# Patient Record
Sex: FEMALE | Race: WHITE | Employment: FULL TIME | URBAN - METROPOLITAN AREA
[De-identification: names, ages, dates, MRNs, and addresses within clinical notes are randomized per-mention and may not be internally consistent; named-entity substitution may affect disease eponyms.]

---

## 2017-05-18 ENCOUNTER — OFFICE VISIT (OUTPATIENT)
Dept: FAMILY MEDICINE CLINIC | Facility: CLINIC | Age: 22
End: 2017-05-18

## 2017-05-18 VITALS
HEART RATE: 82 BPM | DIASTOLIC BLOOD PRESSURE: 74 MMHG | TEMPERATURE: 99 F | SYSTOLIC BLOOD PRESSURE: 126 MMHG | WEIGHT: 243.63 LBS

## 2017-05-18 DIAGNOSIS — L08.9 INFECTION, SKIN: Primary | ICD-10-CM

## 2017-05-18 PROCEDURE — 99213 OFFICE O/P EST LOW 20 MIN: CPT | Performed by: NURSE PRACTITIONER

## 2017-05-18 RX ORDER — CEPHALEXIN 500 MG/1
500 CAPSULE ORAL 3 TIMES DAILY
Qty: 30 CAPSULE | Refills: 0 | Status: SHIPPED | OUTPATIENT
Start: 2017-05-18 | End: 2017-05-28

## 2017-05-18 NOTE — PROGRESS NOTES
Mathew Wilkins is a 19year old female. Patient presents with:   Infection: tattoo on right ankle      HPI:   Complaints of infection to right medial ankle- tattoo on Saturday (5/13) - started slight red on Sunday- then getting worse in the last few days, PLAN:     Infection, skin  (primary encounter diagnosis)    No orders of the defined types were placed in this encounter.        Meds & Refills for this Visit:  Signed Prescriptions Disp Refills    cephALEXin (KEFLEX) 500 MG Oral Cap 30 capsule 0      Sig:

## 2017-05-18 NOTE — PATIENT INSTRUCTIONS
Take Keflex 3 times a day for 7 days–if the infection is still present finish the 10 days. You can continue to apply bacitracin if he would like.     Follow-up if the redness spreads, more swelling, fever, red streak up her leg, etc.

## 2017-08-10 ENCOUNTER — TELEPHONE (OUTPATIENT)
Dept: FAMILY MEDICINE CLINIC | Facility: CLINIC | Age: 22
End: 2017-08-10

## 2017-08-10 DIAGNOSIS — Z28.3 IMMUNIZATION DEFICIENCY: Primary | ICD-10-CM

## 2017-08-10 NOTE — TELEPHONE ENCOUNTER
Patient states she used to live in [facility] so she got most of her immunizations done there. Patient wondering if we have a record of her MMR. Looking in [employer], MMR not noted. Patient informed and will contact her previous Doctor for records.  Júnior Dominguez

## 2017-08-10 NOTE — TELEPHONE ENCOUNTER
Would like to get mmr & rubella titer for work. Need an order put in.   Please let patient know when complete

## 2017-08-10 NOTE — TELEPHONE ENCOUNTER
See previous phone note; childhood immunizations given in [facility] and patient does not have a record of them. Patient is needing to have titers drawn; MMR and Varicella. Please advise orders.  Fiona Farias, 08/10/17, 4:51 PM

## 2017-08-11 NOTE — TELEPHONE ENCOUNTER
Please ask patient if she needs a physical–if she has a form. If she needs a physical form completed she should have an appointment.   If it is just immunization improve then she does not need an appointment

## 2017-12-30 RX ORDER — CITALOPRAM 20 MG/1
TABLET ORAL
Qty: 30 TABLET | Refills: 2 | Status: SHIPPED | OUTPATIENT
Start: 2017-12-30 | End: 2018-02-15

## 2017-12-30 NOTE — TELEPHONE ENCOUNTER
Refill given–please notify patient she is due for a pap/physical-have her schedule- I gave her 3 months worth of medication so she will have time

## 2018-02-15 ENCOUNTER — OFFICE VISIT (OUTPATIENT)
Dept: FAMILY MEDICINE CLINIC | Facility: CLINIC | Age: 23
End: 2018-02-15

## 2018-02-15 ENCOUNTER — APPOINTMENT (OUTPATIENT)
Dept: LAB | Age: 23
End: 2018-02-15
Attending: NURSE PRACTITIONER
Payer: COMMERCIAL

## 2018-02-15 VITALS
SYSTOLIC BLOOD PRESSURE: 112 MMHG | HEIGHT: 67 IN | HEART RATE: 76 BPM | BODY MASS INDEX: 38.14 KG/M2 | RESPIRATION RATE: 16 BRPM | DIASTOLIC BLOOD PRESSURE: 78 MMHG | WEIGHT: 243 LBS | TEMPERATURE: 97 F

## 2018-02-15 DIAGNOSIS — Z00.00 ENCOUNTER FOR HEALTH MAINTENANCE EXAMINATION IN ADULT: Primary | ICD-10-CM

## 2018-02-15 DIAGNOSIS — F34.1 DYSTHYMIA: ICD-10-CM

## 2018-02-15 LAB
ALBUMIN SERPL-MCNC: 3.9 G/DL (ref 3.5–4.8)
ALP LIVER SERPL-CCNC: 83 U/L (ref 37–98)
ALT SERPL-CCNC: 42 U/L (ref 14–54)
AST SERPL-CCNC: 26 U/L (ref 15–41)
BASOPHILS # BLD AUTO: 0.02 X10(3) UL (ref 0–0.1)
BASOPHILS NFR BLD AUTO: 0.2 %
BILIRUB SERPL-MCNC: 0.7 MG/DL (ref 0.1–2)
BUN BLD-MCNC: 14 MG/DL (ref 8–20)
CALCIUM BLD-MCNC: 9 MG/DL (ref 8.3–10.3)
CHLORIDE: 107 MMOL/L (ref 101–111)
CHOLEST SMN-MCNC: 137 MG/DL (ref ?–190)
CO2: 24 MMOL/L (ref 22–32)
CREAT BLD-MCNC: 0.8 MG/DL (ref 0.55–1.02)
EOSINOPHIL # BLD AUTO: 0.2 X10(3) UL (ref 0–0.3)
EOSINOPHIL NFR BLD AUTO: 2.2 %
ERYTHROCYTE [DISTWIDTH] IN BLOOD BY AUTOMATED COUNT: 13.5 % (ref 11.5–16)
FREE T4: 0.8 NG/DL (ref 0.9–1.8)
GLUCOSE BLD-MCNC: 86 MG/DL (ref 70–99)
HAV AB SERPL IA-ACNC: 383 PG/ML (ref 193–986)
HCT VFR BLD AUTO: 43.2 % (ref 34–50)
HDLC SERPL-MCNC: 70 MG/DL (ref 45–?)
HDLC SERPL: 1.96 {RATIO} (ref ?–4.44)
HGB BLD-MCNC: 13.8 G/DL (ref 12–16)
IMMATURE GRANULOCYTE COUNT: 0.04 X10(3) UL (ref 0–1)
IMMATURE GRANULOCYTE RATIO %: 0.4 %
LDLC SERPL CALC-MCNC: 60 MG/DL (ref ?–120)
LYMPHOCYTES # BLD AUTO: 3.17 X10(3) UL (ref 0.9–4)
LYMPHOCYTES NFR BLD AUTO: 34.9 %
M PROTEIN MFR SERPL ELPH: 7.7 G/DL (ref 6.1–8.3)
MCH RBC QN AUTO: 26.8 PG (ref 27–33.2)
MCHC RBC AUTO-ENTMCNC: 31.9 G/DL (ref 31–37)
MCV RBC AUTO: 84 FL (ref 81–100)
MONOCYTES # BLD AUTO: 0.56 X10(3) UL (ref 0.1–1)
MONOCYTES NFR BLD AUTO: 6.2 %
NEUTROPHIL ABS PRELIM: 5.09 X10 (3) UL (ref 1.3–6.7)
NEUTROPHILS # BLD AUTO: 5.09 X10(3) UL (ref 1.3–6.7)
NEUTROPHILS NFR BLD AUTO: 56.1 %
NONHDLC SERPL-MCNC: 67 MG/DL (ref ?–150)
PLATELET # BLD AUTO: 362 10(3)UL (ref 150–450)
POTASSIUM SERPL-SCNC: 4 MMOL/L (ref 3.6–5.1)
RBC # BLD AUTO: 5.14 X10(6)UL (ref 3.8–5.1)
RED CELL DISTRIBUTION WIDTH-SD: 41.7 FL (ref 35.1–46.3)
SODIUM SERPL-SCNC: 139 MMOL/L (ref 136–144)
TRIGL SERPL-MCNC: 37 MG/DL (ref ?–115)
TSI SER-ACNC: 1.3 MIU/ML (ref 0.35–5.5)
VLDLC SERPL CALC-MCNC: 7 MG/DL (ref 5–40)
WBC # BLD AUTO: 9.1 X10(3) UL (ref 4–13)

## 2018-02-15 PROCEDURE — 80050 GENERAL HEALTH PANEL: CPT | Performed by: NURSE PRACTITIONER

## 2018-02-15 PROCEDURE — 80061 LIPID PANEL: CPT | Performed by: NURSE PRACTITIONER

## 2018-02-15 PROCEDURE — 36415 COLL VENOUS BLD VENIPUNCTURE: CPT | Performed by: NURSE PRACTITIONER

## 2018-02-15 PROCEDURE — 84439 ASSAY OF FREE THYROXINE: CPT | Performed by: NURSE PRACTITIONER

## 2018-02-15 PROCEDURE — 82607 VITAMIN B-12: CPT | Performed by: NURSE PRACTITIONER

## 2018-02-15 PROCEDURE — 99395 PREV VISIT EST AGE 18-39: CPT | Performed by: NURSE PRACTITIONER

## 2018-02-15 RX ORDER — CITALOPRAM 20 MG/1
20 TABLET ORAL
Qty: 30 TABLET | Refills: 12 | Status: SHIPPED | OUTPATIENT
Start: 2018-02-15

## 2018-02-15 NOTE — PATIENT INSTRUCTIONS
Continue Celexa- It is important to get enough sleep (at least 7 hrs a night).   Increase EXERCISE, eat a healthy diet (5 fruits and/or vegetables a day), stay hydrated,  take a multivitamin, take fish/krill oil capsules, learn something new, avoid excessiv

## 2018-02-15 NOTE — PROGRESS NOTES
HPI:    Patient ID: Lizzie Eisenberg is a 23year old female. HPI patient is here for her annual physical.  She denies complaints, states overall she feels well. Patient states that she sees a gynecologist for her Pap smears and gynecologic care.   Sta well-developed and well-nourished. No distress. HENT:   Head: Normocephalic and atraumatic.    Right Ear: Hearing, tympanic membrane, external ear and ear canal normal.   Left Ear: Hearing, tympanic membrane, external ear and ear canal normal.   Nose: Nos 0     Nursing note and vitals reviewed.              ASSESSMENT/PLAN:   Encounter for health maintenance examination in adult  (primary encounter diagnosis)  Dysthymia      Orders Placed This Encounter      CBC W Differential W Platelet [E]      Comp [employer]

## 2018-06-04 ENCOUNTER — TELEPHONE (OUTPATIENT)
Dept: FAMILY MEDICINE CLINIC | Facility: CLINIC | Age: 23
End: 2018-06-04

## 2018-06-04 NOTE — TELEPHONE ENCOUNTER
2/15/18 patient had a physical with Mat Leong  Patient states she needs a note for her employer stating that she had a physical done in the last year  Patient states she does NOT have a form that needs to be completed  Patient advised if we print OV

## 2018-06-05 NOTE — TELEPHONE ENCOUNTER
LM for pt to call back to specify whether she would like letter mailed or if she wants to come pick it up.

## 2018-07-11 ENCOUNTER — OFFICE VISIT (OUTPATIENT)
Dept: FAMILY MEDICINE CLINIC | Facility: CLINIC | Age: 23
End: 2018-07-11

## 2018-07-11 ENCOUNTER — APPOINTMENT (OUTPATIENT)
Dept: LAB | Age: 23
End: 2018-07-11
Attending: NURSE PRACTITIONER
Payer: COMMERCIAL

## 2018-07-11 ENCOUNTER — HOSPITAL ENCOUNTER (OUTPATIENT)
Dept: GENERAL RADIOLOGY | Age: 23
Discharge: HOME | End: 2018-07-11
Attending: NURSE PRACTITIONER
Payer: COMMERCIAL

## 2018-07-11 VITALS
HEIGHT: 65.75 IN | BODY MASS INDEX: 41.77 KG/M2 | RESPIRATION RATE: 20 BRPM | WEIGHT: 256.81 LBS | DIASTOLIC BLOOD PRESSURE: 76 MMHG | SYSTOLIC BLOOD PRESSURE: 110 MMHG | TEMPERATURE: 97 F | HEART RATE: 72 BPM

## 2018-07-11 DIAGNOSIS — N91.2 AMENORRHEA: ICD-10-CM

## 2018-07-11 DIAGNOSIS — M67.441 GANGLION CYST OF FINGER OF RIGHT HAND: ICD-10-CM

## 2018-07-11 DIAGNOSIS — M67.441 GANGLION CYST OF FINGER OF RIGHT HAND: Primary | ICD-10-CM

## 2018-07-11 LAB
ESTRADIOL: 35 PG/ML
FSH: 4.5 MIU/ML
HCG QUANTITATIVE: <1 MIU/ML (ref ?–3)
LH: 8.3 MIU/ML
PROGESTERONE: 1.01 NG/ML
PROLACTIN: 13.7 NG/ML
TESTOSTERONE: 41.7 NG/DL (ref 14–76)

## 2018-07-11 PROCEDURE — 73130 X-RAY EXAM OF HAND: CPT | Performed by: NURSE PRACTITIONER

## 2018-07-11 PROCEDURE — 84144 ASSAY OF PROGESTERONE: CPT | Performed by: NURSE PRACTITIONER

## 2018-07-11 PROCEDURE — 82670 ASSAY OF TOTAL ESTRADIOL: CPT | Performed by: NURSE PRACTITIONER

## 2018-07-11 PROCEDURE — 84146 ASSAY OF PROLACTIN: CPT | Performed by: NURSE PRACTITIONER

## 2018-07-11 PROCEDURE — 84702 CHORIONIC GONADOTROPIN TEST: CPT | Performed by: NURSE PRACTITIONER

## 2018-07-11 PROCEDURE — 83002 ASSAY OF GONADOTROPIN (LH): CPT | Performed by: NURSE PRACTITIONER

## 2018-07-11 PROCEDURE — 83001 ASSAY OF GONADOTROPIN (FSH): CPT | Performed by: NURSE PRACTITIONER

## 2018-07-11 PROCEDURE — 84403 ASSAY OF TOTAL TESTOSTERONE: CPT | Performed by: NURSE PRACTITIONER

## 2018-07-11 PROCEDURE — 82627 DEHYDROEPIANDROSTERONE: CPT | Performed by: NURSE PRACTITIONER

## 2018-07-11 PROCEDURE — 36415 COLL VENOUS BLD VENIPUNCTURE: CPT | Performed by: NURSE PRACTITIONER

## 2018-07-11 PROCEDURE — 99214 OFFICE O/P EST MOD 30 MIN: CPT | Performed by: NURSE PRACTITIONER

## 2018-07-11 NOTE — PATIENT INSTRUCTIONS
Follow up with Dr. Garibay Sic for evaluation of your hand.        Will check blood work today for PCOS

## 2018-07-11 NOTE — PROGRESS NOTES
[place] Leyden is a 23year old female.   Patient presents with:  Bump: cyst- L-upper palm- noticed in April      HPI:   Complaints of right hand - lump - to palmar side between 4th and 5th finger- patient states she noticed the lump in April - has gotte skin lesions or rashes  RESPIRATORY: denies complaints   CARDIOVASCULAR: denies complaints   GI: denies complaints   : see HPI   MUSCULOSKELETAL:  See HPI   NEURO: see HPI   PSYCH:denies complaints     EXAM:   /76 (BP Location: Left arm, Patient Po

## 2018-07-12 ENCOUNTER — TELEPHONE (OUTPATIENT)
Dept: FAMILY MEDICINE CLINIC | Facility: CLINIC | Age: 23
End: 2018-07-12

## 2018-07-12 NOTE — TELEPHONE ENCOUNTER
----- Message from FRED Moya sent at 7/12/2018  7:58 AM CDT -----  Please call patient (make sure that she no longer has her IUD–if she does, then this could cause her not to get up.)  My chart message sent as below-  Your hormone levels are nor

## 2018-07-13 LAB — DHEA SULFATE, SERUM: 229 UG/DL

## 2018-07-19 ENCOUNTER — TELEPHONE (OUTPATIENT)
Dept: FAMILY MEDICINE CLINIC | Facility: CLINIC | Age: 23
End: 2018-07-19

## 2018-07-19 DIAGNOSIS — IMO0001 CLASS 3 OBESITY WITH BODY MASS INDEX (BMI) OF 40.0 TO 44.9 IN ADULT, UNSPECIFIED OBESITY TYPE, UNSPECIFIED WHETHER SERIOUS COMORBIDITY PRESENT: Primary | ICD-10-CM

## 2018-07-19 NOTE — TELEPHONE ENCOUNTER
Patient states she works at [employer] and they have a weight loss clinic there.   States they will only accept patients if they have a referral.  Patient states she has been having trouble loosing weight and she has seen Tisha Bella many times for t

## 2018-07-24 NOTE — TELEPHONE ENCOUNTER
Pt notified and verbalized understanding. She states that the clinic itself requires a referral/ I told pt that I would call her back tomorrow after we get the order figured out.

## 2018-07-24 NOTE — TELEPHONE ENCOUNTER
I would be happy to give her a referral, but I am having trouble finding it in Epic (I can find weight loss clinic/bariatric- but not external).   Patient is [employer]- she may not need a referral.  Please have her check with her insurance, if she DOES

## 2018-07-25 NOTE — TELEPHONE ENCOUNTER
Pt notified and verbalized understanding. Referral faxed to [facility]. Fredo's at [phone].

## 2018-08-20 ENCOUNTER — TELEPHONE (OUTPATIENT)
Dept: FAMILY MEDICINE CLINIC | Facility: CLINIC | Age: 23
End: 2018-08-20

## 2018-08-31 ENCOUNTER — TELEPHONE (OUTPATIENT)
Dept: FAMILY MEDICINE CLINIC | Facility: CLINIC | Age: 23
End: 2018-08-31

## 2018-08-31 NOTE — TELEPHONE ENCOUNTER
Why she wanting Zofran? I do not see it on her med list.  If patient is having nausea, she should have an office visit to determine if that is the correct treatment.

## 2018-08-31 NOTE — TELEPHONE ENCOUNTER
Pt requesting Zofran for motion sickness. Pt informed Zofran not recommended. Pt informed can try Dramamine or Benadryl and also instructed to keep well hydrated and to take frequent breaks.

## 2018-11-07 ENCOUNTER — OFFICE VISIT (OUTPATIENT)
Dept: FAMILY MEDICINE CLINIC | Facility: CLINIC | Age: 23
End: 2018-11-07
Payer: COMMERCIAL

## 2018-11-07 VITALS
WEIGHT: 266 LBS | SYSTOLIC BLOOD PRESSURE: 112 MMHG | DIASTOLIC BLOOD PRESSURE: 84 MMHG | OXYGEN SATURATION: 97 % | TEMPERATURE: 97 F | HEIGHT: 65.75 IN | HEART RATE: 77 BPM | RESPIRATION RATE: 20 BRPM | BODY MASS INDEX: 43.26 KG/M2

## 2018-11-07 DIAGNOSIS — L30.9 DERMATITIS: ICD-10-CM

## 2018-11-07 DIAGNOSIS — B35.0 TINEA CAPITIS: Primary | ICD-10-CM

## 2018-11-07 PROCEDURE — 99213 OFFICE O/P EST LOW 20 MIN: CPT | Performed by: FAMILY MEDICINE

## 2018-11-07 RX ORDER — TRIAMCINOLONE ACETONIDE 1 MG/ML
LOTION TOPICAL
Qty: 60 ML | Refills: 1 | Status: SHIPPED | OUTPATIENT
Start: 2018-11-07 | End: 2019-02-28

## 2018-11-07 RX ORDER — TERBINAFINE HYDROCHLORIDE 250 MG/1
250 TABLET ORAL DAILY
Qty: 14 TABLET | Refills: 0 | Status: SHIPPED | OUTPATIENT
Start: 2018-11-07

## 2019-02-25 NOTE — TELEPHONE ENCOUNTER
Future appt:    Last Appointment:      Last wellness exam completed with EL 2/15/18    Triamcinolone Lotion last given on 11/7/18 for #60ml with one refill per RF    Pt needs to get established with New PCP  Left message for pt    Cholesterol, Total (mg/dL

## 2019-02-26 NOTE — TELEPHONE ENCOUNTER
Patient requesting refill of triamcinolone cream.  Please call patient and ask what part of her body she is using the cream on, is it helping, how along his she been using it?   Typically this cream is not meant for long-term use as it can cause skin damage

## 2019-02-28 RX ORDER — TRIAMCINOLONE ACETONIDE 1 MG/ML
LOTION TOPICAL
Qty: 60 ML | Refills: 1 | Status: SHIPPED | OUTPATIENT
Start: 2019-02-28

## 2019-03-09 DIAGNOSIS — Z00.00 ENCOUNTER FOR HEALTH MAINTENANCE EXAMINATION IN ADULT: ICD-10-CM

## 2019-03-09 DIAGNOSIS — F34.1 DYSTHYMIA: ICD-10-CM

## 2019-03-18 LAB
FSH: 5.6
T3 FREE: 2.6
T4, FREE: 1
TESTOSTERONE: 31
TSH: 0.2 UIU/ML

## 2019-03-22 RX ORDER — CITALOPRAM 20 MG/1
TABLET ORAL
Qty: 90 TABLET | Refills: 0 | OUTPATIENT
Start: 2019-03-22

## 2019-03-22 NOTE — TELEPHONE ENCOUNTER
No future appointments. Left detailed message that pt is due for an appt. Pt did not return my call to schedule appt.

## 2020-09-24 RX ORDER — TRIAMCINOLONE ACETONIDE 1 MG/ML
LOTION TOPICAL
Qty: 60 ML | Refills: 1 | OUTPATIENT
Start: 2020-09-24

## 2020-09-24 NOTE — TELEPHONE ENCOUNTER
Patient hasn't been seen in >1 year; almost 2 years. Request denied with the notation that an appt is needed.

## (undated) NOTE — LETTER
10/19/17        Asim Valencia  [address] [facility] [address] [facility]      Dear Karl Bliss,    [facility] records indicate that you have outstanding lab work and or testing that was ordered for you and has not yet been completed:          Measles (Rubeola) Antibodies,

## (undated) NOTE — MR AVS SNAPSHOT
[address]  [place] [id number]  [phone]               Thank you for choosing us for your health care visit with FRED Albert.   We are glad to serve you and happy to provide you with this summary o [employer]IVO will allow you to access patient instructions from your recent visit,  view other health information, and more. To sign up or find more information, go to [email or web address] and click on the Sign Up Now link in the [employer] box.      Enter 2 ½ hours per week – spread out over time Use a sathya to keep you motivated   Don’t forget strength training with weights and resistance Set goals and track your progress   You don’t need to join a gym. Home exercises work great.  Put more priority on exe